# Patient Record
Sex: MALE | Race: WHITE | NOT HISPANIC OR LATINO | Employment: STUDENT | URBAN - METROPOLITAN AREA
[De-identification: names, ages, dates, MRNs, and addresses within clinical notes are randomized per-mention and may not be internally consistent; named-entity substitution may affect disease eponyms.]

---
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
## 2023-08-03 ENCOUNTER — OFFICE VISIT (OUTPATIENT)
Dept: FAMILY MEDICINE CLINIC | Facility: CLINIC | Age: 23
End: 2023-08-03
Payer: COMMERCIAL

## 2023-08-03 VITALS
RESPIRATION RATE: 14 BRPM | HEIGHT: 75 IN | SYSTOLIC BLOOD PRESSURE: 132 MMHG | TEMPERATURE: 97 F | WEIGHT: 212 LBS | DIASTOLIC BLOOD PRESSURE: 80 MMHG | BODY MASS INDEX: 26.36 KG/M2 | HEART RATE: 76 BPM | OXYGEN SATURATION: 98 %

## 2023-08-03 DIAGNOSIS — F32.1 MAJOR DEPRESSIVE DISORDER, SINGLE EPISODE, MODERATE: ICD-10-CM

## 2023-08-03 DIAGNOSIS — F90.9 ATTENTION DEFICIT HYPERACTIVITY DISORDER (ADHD), UNSPECIFIED ADHD TYPE: Primary | ICD-10-CM

## 2023-08-03 PROCEDURE — 99203 OFFICE O/P NEW LOW 30 MIN: CPT | Performed by: NURSE PRACTITIONER

## 2023-08-03 RX ORDER — BUPROPION HYDROCHLORIDE 150 MG/1
150 TABLET ORAL DAILY
Qty: 30 TABLET | Refills: 0 | Status: SHIPPED | OUTPATIENT
Start: 2023-08-03

## 2023-08-03 NOTE — PATIENT INSTRUCTIONS
Discharge instructions      Focus on a daily to do list,  Have a specific study plan daily you need to study most days of the week at least 6 days a week  To keep up and maintain start starting from day 1 for your next test, stay on top of things obsessively with your school and this will make your life easier.    Make sure you good good sleep, wean off from marijuana,  Avoid alcohol or responsibly as discussed on the weekend.      Consider a counselor,  Psychiatry if ADHD medication is required,    Bupropion 150 mg start this in the morning tomorrow  Daily for the first week then increase to 2 a day thereafter but call me let me know you are doing okay I will need to rewrite your prescription to 300 mg daily  Or I can do this when you come back  Recheck in the office in 2 weeks  Any severe symptoms severe agitation abnormal thinking suicidal ideation emergency room    Antidepressants, persons less than 25 need to be monitored more closely due to blackbox warnings but these are still considered safe and benefit far outweighs any risk for you in my opinion  Local behavioral [facility]  [facility]

## 2023-08-07 NOTE — PROGRESS NOTES
"Chief Complaint  Establish Care, Annual Exam, Depression, and Anxiety    Subjective        Matt Khanna presents to [facility]  History of Present Illness  Pleasant patient here today needs a new PCP as well as to discuss the potential for ADHD treatment was diagnosed  Right middle school I believe he had formalized testing and signs and symptoms which began sooner, at that time he had taken Strattera modest improvement,  He is more interested in bupropion now as well as treatment other possibilities,  Presently working full-time as well started college,  Just feels like he needs more focus, he has a difficult time getting focused and staying on task and he thinks it is related to his ADHD  Denies any increased depression or anxiety otherwise no increasing mood disorder symptoms, no agitation racing thoughts otherwise.      Social history no alcohol abuse but does smoke marijuana,  But willing to stop.  Depression  Anxiety      His past medical history is significant for depression.     Objective   Vital Signs:  /80   Pulse 76   Temp 97 øF (36.1 øC) (Temporal)   Resp 14   Ht 190.5 cm (75\")   Wt 96.2 kg (212 lb)   SpO2 98%   BMI 26.50 kg/mý   Estimated body mass index is 26.5 kg/mý as calculated from the following:    Height as of this encounter: 190.5 cm (75\").    Weight as of this encounter: 96.2 kg (212 lb).          Physical Exam   Result Review :                Assessment and Plan   Diagnoses and all orders for this visit:    1. Attention deficit hyperactivity disorder (ADHD), unspecified ADHD type (Primary)    2. Major depressive disorder, single episode, moderate    Other orders  -     buPROPion XL (Wellbutrin XL) 150 MG 24 hr tablet; Take 1 tablet by mouth Daily.  Dispense: 30 tablet; Refill: 0             Follow Up   Return in about 2 weeks (around 8/17/2023).  Patient was given instructions and counseling regarding his condition or for health maintenance advice. " Please see specific information pulled into the AVS if appropriate.       Bupropion may be a good choice however, the true stimulants typically are more effective in treating ADHD it depends, we can try this and he can go forward if needed to psychiatry  Discussed black box warning need for closer follow-up,  Risk-benefit other alternatives discussed,  Encourage lifestyle changes,    Encourage continue good decisions    Patient Instructions   Discharge instructions      Focus on a daily to do list,  Have a specific study plan daily you need to study most days of the week at least 6 days a week  To keep up and maintain start starting from day 1 for your next test, stay on top of things obsessively with your school and this will make your life easier.    Make sure you good good sleep, wean off from marijuana,  Avoid alcohol or responsibly as discussed on the weekend.      Consider a counselor,  Psychiatry if ADHD medication is required,    Bupropion 150 mg start this in the morning tomorrow  Daily for the first week then increase to 2 a day thereafter but call me let me know you are doing okay I will need to rewrite your prescription to 300 mg daily  Or I can do this when you come back  Recheck in the office in 2 weeks  Any severe symptoms severe agitation abnormal thinking suicidal ideation emergency room    Antidepressants, persons less than 25 need to be monitored more closely due to blackbox warnings but these are still considered safe and benefit far outweighs any risk for you in my opinion  Local behavioral [facility]  [facility]

## 2023-08-17 ENCOUNTER — OFFICE VISIT (OUTPATIENT)
Dept: FAMILY MEDICINE CLINIC | Facility: CLINIC | Age: 23
End: 2023-08-17
Payer: COMMERCIAL

## 2023-08-17 VITALS
BODY MASS INDEX: 25.8 KG/M2 | DIASTOLIC BLOOD PRESSURE: 64 MMHG | TEMPERATURE: 96 F | SYSTOLIC BLOOD PRESSURE: 120 MMHG | HEART RATE: 72 BPM | OXYGEN SATURATION: 96 % | WEIGHT: 207.5 LBS | HEIGHT: 75 IN

## 2023-08-17 DIAGNOSIS — F98.8 ATTENTION DEFICIT DISORDER, UNSPECIFIED HYPERACTIVITY PRESENCE: Primary | ICD-10-CM

## 2023-08-17 PROCEDURE — 99214 OFFICE O/P EST MOD 30 MIN: CPT | Performed by: NURSE PRACTITIONER

## 2023-08-17 RX ORDER — BUPROPION HYDROCHLORIDE 150 MG/1
150 TABLET ORAL DAILY
Qty: 30 TABLET | Refills: 1 | Status: SHIPPED | OUTPATIENT
Start: 2023-08-17

## 2023-08-17 NOTE — PATIENT INSTRUCTIONS
Discharge instructions continue present plan,  Cognitive behavior therapy, have a specific strategy to help you make changes such as procrastination daily to do list and make sure he Prosulf at least 1-3 items a day    Focus on developing study habits ask your friends what they do they are doing well, Google study habits,    Less effort more results, equals less stress, more success, master this at a young age,  Realize the importance and really start studying out of the gait that is how you will do well with his semester, you require at least 2 hours a day of study and likely in doing that she will likely make straight A's,    Continue these changes you live, initially you have a little more anxiety doing this but later the anxiety levels will go down greatly,    Increased anxiety agitation irritability abnormal thinking stop the medication and seek immediate treatment but otherwise follow-up here in 6 weeks if you could update me in 2 to 3 weeks that would be great thank you

## 2023-08-22 NOTE — PROGRESS NOTES
"Chief Complaint  ADHD (3 week f/u.  \"I quit liked it.\" Denies reactions to new meds.  Noticed one episode of eating food right after 3rd shift and vomited.)    Subjective        Matt Khanna presents to [facility]  History of Present Illness  Pleasant patient here today follow-up attention deficit possibly attention deficit disorder,  Chronic anxiety chronic depression major, mild to moderate, quite a bit depressed sedation, motivation difficulties,  Started bupropion, helped him quite a bit is able to focus and have more energy he had no negative side effects no racing thoughts or irritability agitation or any insomnia he wants to continue this,  He is made a lot of changes in his life,  Preparing to go back to school,  Quit smoking marijuana as well.      Objective   Vital Signs:  /64   Pulse 72   Temp 96 øF (35.6 øC) (Temporal)   Ht 190.5 cm (75\")   Wt 94.1 kg (207 lb 8 oz)   SpO2 96%   BMI 25.94 kg/mý   Estimated body mass index is 25.94 kg/mý as calculated from the following:    Height as of this encounter: 190.5 cm (75\").    Weight as of this encounter: 94.1 kg (207 lb 8 oz).          Physical Exam  Constitutional:       Appearance: Normal appearance. He is not ill-appearing or diaphoretic.   Eyes:      Conjunctiva/sclera: Conjunctivae normal.      Pupils: Pupils are equal, round, and reactive to light.   Skin:     General: Skin is warm and dry.   Neurological:      Mental Status: He is alert.   Psychiatric:         Mood and Affect: Mood normal.         Behavior: Behavior normal.         Thought Content: Thought content normal.         Judgment: Judgment normal.      Comments: Pleasant appropriate good eye contact,  Mildly anxious but smiling at times, appropriately speech clear concise without pressured speech able to stay on topic and finished without tangential thinking dress appropriate      Result Review :                Assessment and Plan   Diagnoses and all " orders for this visit:    1. Attention deficit disorder, unspecified hyperactivity presence (Primary)    Other orders  -     buPROPion XL (Wellbutrin XL) 150 MG 24 hr tablet; Take 1 tablet by mouth Daily.  Dispense: 30 tablet; Refill: 1           I spent 30  minutes caring for Matt on this date of service. This time includes time spent by me in the following activities:preparing for the visit, reviewing tests, obtaining and/or reviewing a separately obtained history, performing a medically appropriate examination and/or evaluation , counseling and educating the patient/family/caregiver, referring and communicating with other health care professionals , documenting information in the medical record, and care coordination  Follow Up   No follow-ups on file.  Patient was given instructions and counseling regarding his condition or for health maintenance advice. Please see specific information pulled into the AVS if appropriate.     Patient Instructions   Discharge instructions continue present plan,  Cognitive behavior therapy, have a specific strategy to help you make changes such as procrastination daily to do list and make sure he Prosulf at least 1-3 items a day    Focus on developing study habits ask your friends what they do they are doing well, Google study habits,    Less effort more results, equals less stress, more success, master this at a young age,  Realize the importance and really start studying out of the gait that is how you will do well with his semester, you require at least 2 hours a day of study and likely in doing that she will likely make straight A's,    Continue these changes you live, initially you have a little more anxiety doing this but later the anxiety levels will go down greatly,    Increased anxiety agitation irritability abnormal thinking stop the medication and seek immediate treatment but otherwise follow-up here in 6 weeks if you could update me in 2 to 3 weeks that would be  great thank you

## 2023-10-20 ENCOUNTER — OFFICE VISIT (OUTPATIENT)
Dept: FAMILY MEDICINE CLINIC | Facility: CLINIC | Age: 23
End: 2023-10-20
Payer: COMMERCIAL

## 2023-10-20 VITALS
WEIGHT: 204 LBS | DIASTOLIC BLOOD PRESSURE: 80 MMHG | OXYGEN SATURATION: 97 % | SYSTOLIC BLOOD PRESSURE: 140 MMHG | HEART RATE: 80 BPM | TEMPERATURE: 98.9 F | BODY MASS INDEX: 25.36 KG/M2 | HEIGHT: 75 IN

## 2023-10-20 DIAGNOSIS — F41.9 ANXIETY: Primary | ICD-10-CM

## 2023-10-20 PROCEDURE — 99214 OFFICE O/P EST MOD 30 MIN: CPT | Performed by: NURSE PRACTITIONER

## 2023-10-20 RX ORDER — ESCITALOPRAM OXALATE 5 MG/1
5 TABLET ORAL DAILY
Qty: 30 TABLET | Refills: 1 | Status: SHIPPED | OUTPATIENT
Start: 2023-10-20

## 2023-10-20 RX ORDER — BUPROPION HYDROCHLORIDE 75 MG/1
75 TABLET ORAL DAILY
Qty: 30 TABLET | Refills: 2 | Status: SHIPPED | OUTPATIENT
Start: 2023-10-20

## 2023-10-20 NOTE — PATIENT INSTRUCTIONS
Discharge instructions continue  Present plan you are doing very well in the big picture, you are very motivated you will do well    [facility] call ASAP for appointment    Otherwise  Bright lights positive aromas  Positive music, good study location focus on study habits 1 to 2 hours each subject daily with these upper-level difficult classes at least 5 to 6 days a week    Decrease bupropion down to 75 mg daily  In the morning start Lexapro 5 mg 1/2 tablet daily for 2 weeks if drowsiness or increased anxiety then every other day for 2 weeks  Will increase it to 1 tablet as soon as possible or anytime that recheck in the office in 2 weeks

## 2023-10-25 NOTE — PROGRESS NOTES
"Chief Complaint  Follow-up (6w follow up)    Subjective        Matt Khanna presents to [facility]  History of Present Illness  Pleasant gentleman, making lots of changes in his life and now going to nursing school, under quite a bit of pressure.  Quit smoking marijuana couple months ago, he has had increased anxiety, is no suicidal ideation no agitation no severe depression,  Bupropion has been helping, but now he is really in the middle school and thinks this is what is going on just feels overwhelmed with the studies he studying every day getting into groups as well but does not feel like he is making a lot of progress, has increased anxiety and did not do well on her recent test he did speak to his instructor he possibly can pull it out,    No immediate,  No drug abuse or alcohol abuse he is making good decisions presently      Objective   Vital Signs:  /80   Pulse 80   Temp 98.9 °F (37.2 °C)   Ht 190.5 cm (75\")   Wt 92.5 kg (204 lb)   SpO2 97%   BMI 25.50 kg/m²   Estimated body mass index is 25.5 kg/m² as calculated from the following:    Height as of this encounter: 190.5 cm (75\").    Weight as of this encounter: 92.5 kg (204 lb).          Physical Exam  Constitutional:       General: He is not in acute distress.     Appearance: Normal appearance. He is not ill-appearing, toxic-appearing or diaphoretic.   Eyes:      Conjunctiva/sclera: Conjunctivae normal.      Pupils: Pupils are equal, round, and reactive to light.   Pulmonary:      Effort: Pulmonary effort is normal. No respiratory distress.      Breath sounds: No stridor.   Skin:     General: Skin is warm and dry.   Neurological:      General: No focal deficit present.      Mental Status: He is alert. Mental status is at baseline.   Psychiatric:         Mood and Affect: Mood normal.         Behavior: Behavior normal.         Thought Content: Thought content normal.         Judgment: Judgment normal.      Comments: " Anxious, fidgets frequently in bed good eye contact quite pleasant answers appropriately  No pressured speech no dominique symptoms,  Able to sit comfortably in his chair, without pacing, dress appropriate as well and is cognitively intact and rational.        Result Review :                Assessment and Plan   Diagnoses and all orders for this visit:    1. Anxiety (Primary)    Other orders  -     buPROPion (WELLBUTRIN) 75 MG tablet; Take 1 tablet by mouth Daily.  Dispense: 30 tablet; Refill: 2  -     escitalopram (Lexapro) 5 MG tablet; Take 1 tablet by mouth Daily. (Start 1/2 tab x 2 weeks)  Dispense: 30 tablet; Refill: 1             Follow Up   Return in about 2 weeks (around 11/3/2023).  Patient was given instructions and counseling regarding his condition or for health maintenance advice. Please see specific information pulled into the AVS if appropriate.     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  I think the study situations is not ideal for how he feels everyone else is anxiety and is not making progress with the study sessions  As he may have ADD see psychiatry as soon as possible, I will decrease the bupropion a little  And add a low-dose Lexapro cautiously not to interfere with his studies  We will increase gradually a lot of reassurance today and hopefully encouraged him better study habits and sometimes it may be best to avoid group situations because of increased anxiety and difficulty with focusing reach out to his family friends immediately any severe or worse depression or other difficulties suicidal ideation emergency room.  Will have close follow-up is good communication family friends have discussed black box warnings  Thoroughly with the plan with patient  Office visit 30-minute  greater than 50% counseling        Patient Instructions   Discharge instructions continue  Present plan you are doing very well in the big picture, you are very motivated you will do well    [facility] [place] call ASAP for appointment    Otherwise  Bright lights positive aromas  Positive music, good study location focus on study habits 1 to 2 hours each subject daily with these upper-level difficult classes at least 5 to 6 days a week    Decrease bupropion down to 75 mg daily  In the morning start Lexapro 5 mg 1/2 tablet daily for 2 weeks if drowsiness or increased anxiety then every other day for 2 weeks  Will increase it to 1 tablet as soon as possible or anytime that recheck in the office in 2 weeks

## 2023-11-03 ENCOUNTER — OFFICE VISIT (OUTPATIENT)
Dept: FAMILY MEDICINE CLINIC | Facility: CLINIC | Age: 23
End: 2023-11-03
Payer: COMMERCIAL

## 2023-11-03 VITALS
OXYGEN SATURATION: 97 % | WEIGHT: 208 LBS | SYSTOLIC BLOOD PRESSURE: 128 MMHG | TEMPERATURE: 98 F | RESPIRATION RATE: 14 BRPM | BODY MASS INDEX: 25.86 KG/M2 | HEART RATE: 86 BPM | HEIGHT: 75 IN | DIASTOLIC BLOOD PRESSURE: 80 MMHG

## 2023-11-03 DIAGNOSIS — F90.9 ATTENTION DEFICIT HYPERACTIVITY DISORDER (ADHD), UNSPECIFIED ADHD TYPE: Primary | ICD-10-CM

## 2023-11-03 PROCEDURE — 99214 OFFICE O/P EST MOD 30 MIN: CPT | Performed by: NURSE PRACTITIONER

## 2023-11-03 RX ORDER — ATOMOXETINE 18 MG/1
18 CAPSULE ORAL DAILY
Qty: 30 CAPSULE | Refills: 0 | Status: SHIPPED | OUTPATIENT
Start: 2023-11-03

## 2023-11-03 RX ORDER — ESCITALOPRAM OXALATE 10 MG/1
10 TABLET ORAL DAILY
Qty: 30 TABLET | Refills: 1 | Status: SHIPPED | OUTPATIENT
Start: 2023-11-03

## 2023-11-03 NOTE — PATIENT INSTRUCTIONS
Discharge instructions    Discontinue bupropion    Increase Lexapro  Starting today  5 mg daily  For 3 days then 10 mg daily thereafter    Start Strattera lower dose, 18 mg daily  After 3 days increase to 2 a day  Recheck with me in 1 week    Any difficulties any severe anxiety abnormal thinking suicidal ideation emergency room  Otherwise continue present plan you doing very well discontinue sticking with this, getting through these hurdles, will make you stronger than long.    Avoid alcohol this weekend, you cannot afford to have 2 to 3 days of your brain recovering from alcohol,  I would encourage you no more than 4 beers 2 days a week responsibly    Call for counseling and psychiatry appointment  [facility]  [facility]    [facility] neuropsychiatric assessment is very excellent if you need more insight and your mental health this would be quite informative as well, Juanpablo Garcia call for appointment  You can do both

## 2023-11-06 ENCOUNTER — TELEPHONE (OUTPATIENT)
Dept: FAMILY MEDICINE CLINIC | Facility: CLINIC | Age: 23
End: 2023-11-06
Payer: COMMERCIAL

## 2023-11-06 DIAGNOSIS — Z11.59 NEED FOR HEPATITIS C SCREENING TEST: Primary | ICD-10-CM

## 2023-11-06 DIAGNOSIS — Z79.899 MEDICATION MANAGEMENT: ICD-10-CM

## 2023-11-06 NOTE — TELEPHONE ENCOUNTER
Pt scheduled for LABS on Wednesday 11/08/23 @ 10:50 am before Follow up/Physical Appt on Friday 11/10/23 @ 3:15pm.  Orders for LABS needed please.

## 2023-11-09 NOTE — PROGRESS NOTES
"Chief Complaint  Anxiety (Pt mother states pt is still having bad anxiety. Wants to know if there is something norman can give to help with anxiety as well as ADHD )    Subjective        Matt Khanna presents to [facility]  History of Present Illness  Pleasant gentleman here today follow-up attention deficit difficulties anxiety, especially after making great changes in his life,  He has increased anxiety feelings of panic, difficulty concentrating,  His mother is here today with support also to help explain his past medical history as well.  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  We had started very low-dose of Lexapro and  Decreased his bupropion a follow-up but he still has increased anxiety, he is taking a temporary  Break from work,  He recently quit smoking marijuana, and is making great changes in his life    Diagnosed with attention deficit disorder early age, around 7 greater so opted to treat with supportive treatment, but for some time he took Strattera he did quite well he is interested in starting this today  Anxiety            Objective   Vital Signs:  /80   Pulse 86   Temp 98 °F (36.7 °C) (Temporal)   Resp 14   Ht 190.5 cm (75\")   Wt 94.3 kg (208 lb)   SpO2 97%   BMI 26.00 kg/m²   Estimated body mass index is 26 kg/m² as calculated from the following:    Height as of this encounter: 190.5 cm (75\").    Weight as of this encounter: 94.3 kg (208 lb).          Physical Exam  Constitutional:       General: He is not in acute distress.     Appearance: Normal appearance. He is not ill-appearing, toxic-appearing or diaphoretic.   HENT:      Right Ear: Tympanic membrane normal.      Left Ear: Tympanic membrane normal.   Eyes:      " Conjunctiva/sclera: Conjunctivae normal.      Pupils: Pupils are equal, round, and reactive to light.   Skin:     General: Skin is warm and dry.      Capillary Refill: Capillary refill takes less than 2 seconds.   Neurological:      General: No focal deficit present.      Mental Status: He is alert. Mental status is at baseline.   Psychiatric:         Mood and Affect: Mood normal.         Behavior: Behavior normal.         Thought Content: Thought content normal.         Judgment: Judgment normal.      Comments: Anxious a little fidgety but good eye contact, speech is clear, appropriate no dominique, he can stay on target and answer questions appropriately without tangential thinking, able to close his thought, stay organized,  And appropriate interview, he is quite pleasant, dress appropriate for situation.        Result Review :                Assessment and Plan   Diagnoses and all orders for this visit:    1. Attention deficit hyperactivity disorder (ADHD), unspecified ADHD type (Primary)    Other orders  -     atomoxetine (Strattera) 18 MG capsule; Take 1 capsule by mouth Daily. For ADD  Dispense: 30 capsule; Refill: 0  -     escitalopram (Lexapro) 10 MG tablet; Take 1 tablet by mouth Daily.  Dispense: 30 tablet; Refill: 1           I spent 30  minutes caring for Matt on this date of service. This time includes time spent by me in the following activities:preparing for the visit, reviewing tests, obtaining and/or reviewing a separately obtained history, performing a medically appropriate examination and/or evaluation , counseling and educating the patient/family/caregiver, ordering medications, tests, or procedures, documenting information in the medical record, and care coordination  Follow Up   Return in about 1 week (around 11/10/2023) for Annual physical, Labs before next visit.  Patient was given instructions and counseling regarding his condition or for health maintenance advice. Please see specific  information pulled into the AVS if appropriate.     Patient Instructions   Discharge instructions    Discontinue bupropion    Increase Lexapro  Starting today  5 mg daily  For 3 days then 10 mg daily thereafter    Start Strattera lower dose, 18 mg daily  After 3 days increase to 2 a day  Recheck with me in 1 week    Any difficulties any severe anxiety abnormal thinking suicidal ideation emergency room  Otherwise continue present plan you doing very well discontinue sticking with this, getting through these hurdles, will make you stronger than long.    Avoid alcohol this weekend, you cannot afford to have 2 to 3 days of your brain recovering from alcohol,  I would encourage you no more than 4 beers 2 days a week responsibly    Call for counseling and psychiatry appointment  [facility]  [facility] [place] [facility]    [facility] neuropsychiatric assessment is very excellent if you need more insight and your mental health this would be quite informative as well, Juanpablo Garcia call for appointment  You can do both

## 2023-11-15 ENCOUNTER — OFFICE VISIT (OUTPATIENT)
Dept: FAMILY MEDICINE CLINIC | Facility: CLINIC | Age: 23
End: 2023-11-15
Payer: COMMERCIAL

## 2023-11-15 VITALS
OXYGEN SATURATION: 97 % | HEART RATE: 87 BPM | SYSTOLIC BLOOD PRESSURE: 140 MMHG | RESPIRATION RATE: 14 BRPM | DIASTOLIC BLOOD PRESSURE: 68 MMHG | BODY MASS INDEX: 27.35 KG/M2 | HEIGHT: 75 IN | WEIGHT: 220 LBS | TEMPERATURE: 97.7 F

## 2023-11-15 DIAGNOSIS — Z11.3 SCREEN FOR STD (SEXUALLY TRANSMITTED DISEASE): ICD-10-CM

## 2023-11-15 DIAGNOSIS — Z11.59 NEED FOR HEPATITIS B SCREENING TEST: ICD-10-CM

## 2023-11-15 DIAGNOSIS — D22.9 NUMEROUS SKIN MOLES: ICD-10-CM

## 2023-11-15 DIAGNOSIS — Z00.00 HEALTH MAINTENANCE EXAMINATION: Primary | ICD-10-CM

## 2023-11-15 DIAGNOSIS — F41.9 ANXIETY: ICD-10-CM

## 2023-11-15 PROCEDURE — 99395 PREV VISIT EST AGE 18-39: CPT | Performed by: NURSE PRACTITIONER

## 2023-11-15 RX ORDER — ATOMOXETINE 80 MG/1
80 CAPSULE ORAL DAILY
Qty: 30 CAPSULE | Refills: 2 | Status: SHIPPED | OUTPATIENT
Start: 2023-11-15

## 2023-11-15 RX ORDER — ATOMOXETINE 40 MG/1
40 CAPSULE ORAL DAILY
Qty: 30 CAPSULE | Refills: 0 | Status: SHIPPED | OUTPATIENT
Start: 2023-11-15

## 2023-11-15 NOTE — PROGRESS NOTES
"Chief Complaint  ADHD and Annual Exam    Subjective        Matt Khanna presents to [facility]  History of Present Illness  Pleasant gentleman here today for health maintenance as well as follow-up situational anxiety ADHD,  Patient's been making great changes in his life, he is very motivated and focused on college, but during the transition, he was having increased anxiety, feelings of panic, difficulty concentrating, he spent lots of effort in his studies, and not doing as well as he should and specifically several areas of difficulties in his classes.  Originally we tried generic Wellbutrin initially was helpful but actually was probably causing increased anxiety, as well as the stresses of school.  He was here accompanied by his mother recently, and we discussed increasing Lexapro, as well as Strattera he did well with this in the past  Since he has been getting good communications with instructors and specifically wants to take an additional time for him, and he is getting testing and he may have some reading comprehension issues,  At some point he may consider stimulant as well and he has follow-up testing next week,  He wants to continue Lexapro very pleased with the improvement with his anxiety levels, and he would like to go ahead move Strattera up as soon as possible, at this point has not noticed a difference with Strattera but he did great and middle school.  Previously had taken a total of 80 mg daily.    Presently no drug or alcohol abuse recently quit smoking marijuana      Objective   Vital Signs:  /68   Pulse 87   Temp 97.7 °F (36.5 °C) (Infrared)   Resp 14   Ht 190.5 cm (75\")   Wt 99.8 kg (220 lb)   SpO2 97%   BMI 27.50 kg/m²   Estimated body mass index is 27.5 kg/m² as calculated from the following:    Height as of this encounter: 190.5 cm (75\").    Weight as of this encounter: 99.8 kg (220 lb).          Physical Exam  Vitals reviewed. "   Constitutional:       General: He is not in acute distress.     Appearance: Normal appearance. He is well-developed. He is not ill-appearing, toxic-appearing or diaphoretic.   HENT:      Head: Normocephalic.      Nose: Nose normal.   Eyes:      General: No scleral icterus.     Conjunctiva/sclera: Conjunctivae normal.      Pupils: Pupils are equal, round, and reactive to light.   Neck:      Thyroid: No thyromegaly.      Vascular: No JVD.   Cardiovascular:      Rate and Rhythm: Normal rate and regular rhythm.      Heart sounds: Normal heart sounds. No murmur heard.     No friction rub. No gallop.   Pulmonary:      Effort: Pulmonary effort is normal. No respiratory distress.      Breath sounds: Normal breath sounds. No stridor. No wheezing or rales.   Abdominal:      General: Abdomen is flat. Bowel sounds are normal. There is no distension.      Palpations: Abdomen is soft.      Tenderness: There is no abdominal tenderness.      Comments: No hepatosplenomegaly, no ascites,   Musculoskeletal:         General: No tenderness.      Cervical back: Neck supple.   Lymphadenopathy:      Cervical: No cervical adenopathy.   Skin:     General: Skin is warm and dry.      Findings: No erythema or rash.      Comments: Quite a few macules and moles, nothing worrisome, chest and back.   Neurological:      General: No focal deficit present.      Mental Status: He is alert and oriented to person, place, and time. Mental status is at baseline.      Deep Tendon Reflexes: Reflexes are normal and symmetric.   Psychiatric:         Mood and Affect: Mood normal.         Behavior: Behavior normal.         Thought Content: Thought content normal.         Judgment: Judgment normal.      Comments: Good eye contact appropriate pleasant appropriate dress cognitively intact able to express himself without tangential thinking,.        Result Review :                Assessment and Plan   Diagnoses and all orders for this visit:    1. Health  maintenance examination (Primary)  -     Lipid Panel With LDL / HDL Ratio; Future  -     TSH Rfx On Abnormal To Free T4; Future  -     Urinalysis With Microscopic If Indicated (No Culture) - Urine, Clean Catch; Future  -     Chlamydia trachomatis, Neisseria gonorrhoeae, Trichomonas vaginalis, PCR - Urine, Urine, Clean Catch; Future  -     Hepatitis B Surface Antibody; Future    2. Numerous skin moles  -     Ambulatory Referral to Dermatology    3. Anxiety  -     Lipid Panel With LDL / HDL Ratio; Future  -     TSH Rfx On Abnormal To Free T4; Future  -     Urinalysis With Microscopic If Indicated (No Culture) - Urine, Clean Catch; Future  -     Chlamydia trachomatis, Neisseria gonorrhoeae, Trichomonas vaginalis, PCR - Urine, Urine, Clean Catch; Future    4. Screen for STD (sexually transmitted disease)  -     Lipid Panel With LDL / HDL Ratio; Future  -     TSH Rfx On Abnormal To Free T4; Future  -     Urinalysis With Microscopic If Indicated (No Culture) - Urine, Clean Catch; Future  -     Chlamydia trachomatis, Neisseria gonorrhoeae, Trichomonas vaginalis, PCR - Urine, Urine, Clean Catch; Future    5. Need for hepatitis B screening test  -     Hepatitis B Surface Antibody; Future    Other orders  -     atomoxetine (Strattera) 40 MG capsule; Take 1 capsule by mouth Daily.  Dispense: 30 capsule; Refill: 0  -     atomoxetine (Strattera) 80 MG capsule; Take 1 capsule by mouth Daily. For ADHD  Dispense: 30 capsule; Refill: 2             Follow Up   No follow-ups on file.  Patient was given instructions and counseling regarding his condition or for health maintenance advice. Please see specific information pulled into the AVS if appropriate.     Patient Instructions   Discharge instructions    Increase Strattera  40 mg daily for 3 to 7 days  As long as you are doing well increase it to 80 mg daily thereafter but if increased anxiety or any issues immediately hold.  And lower the dose  Continue Lexapro 10 mg daily no  change  Recheck in 2 weeks,    Follow-up with testing  At some point scheduled appointment with psychiatry likely,  Continue counseling, counseling would be excellent ,    As long as you are doing well,  2 weeks.  Fasting lab  When you return or outpatient lab at the hospital at your convenience fasting

## 2023-11-15 NOTE — PATIENT INSTRUCTIONS
Discharge instructions    Increase Strattera  40 mg daily for 3 to 7 days  As long as you are doing well increase it to 80 mg daily thereafter but if increased anxiety or any issues immediately hold.  And lower the dose  Continue Lexapro 10 mg daily no change  Recheck in 2 weeks,    Follow-up with testing  At some point scheduled appointment with psychiatry likely,  Continue counseling, counseling would be excellent ,    As long as you are doing well,  2 weeks.  Fasting lab  When you return or outpatient lab at the hospital at your convenience fasting

## 2023-12-19 ENCOUNTER — CLINICAL SUPPORT (OUTPATIENT)
Dept: FAMILY MEDICINE CLINIC | Facility: CLINIC | Age: 23
End: 2023-12-19
Payer: COMMERCIAL

## 2023-12-19 DIAGNOSIS — Z23 NEED FOR HEPATITIS B VACCINATION: Primary | ICD-10-CM

## 2023-12-19 PROCEDURE — 90471 IMMUNIZATION ADMIN: CPT | Performed by: NURSE PRACTITIONER

## 2023-12-19 PROCEDURE — 95115 IMMUNOTHERAPY ONE INJECTION: CPT | Performed by: NURSE PRACTITIONER

## 2023-12-19 PROCEDURE — 90744 HEPB VACC 3 DOSE PED/ADOL IM: CPT | Performed by: NURSE PRACTITIONER

## 2024-01-25 ENCOUNTER — OFFICE VISIT (OUTPATIENT)
Dept: FAMILY MEDICINE CLINIC | Facility: CLINIC | Age: 24
End: 2024-01-25
Payer: COMMERCIAL

## 2024-01-25 VITALS
OXYGEN SATURATION: 99 % | HEART RATE: 88 BPM | WEIGHT: 220 LBS | DIASTOLIC BLOOD PRESSURE: 76 MMHG | BODY MASS INDEX: 27.35 KG/M2 | SYSTOLIC BLOOD PRESSURE: 144 MMHG | HEIGHT: 75 IN | TEMPERATURE: 98 F

## 2024-01-25 DIAGNOSIS — F41.9 ANXIETY: ICD-10-CM

## 2024-01-25 DIAGNOSIS — F98.8 ATTENTION DEFICIT DISORDER, UNSPECIFIED HYPERACTIVITY PRESENCE: Primary | ICD-10-CM

## 2024-01-25 PROCEDURE — 99213 OFFICE O/P EST LOW 20 MIN: CPT | Performed by: NURSE PRACTITIONER

## 2024-01-25 NOTE — PATIENT INSTRUCTIONS
Discharge instructions    Psychiatry call for an appointment [facility]  [facility] or psychiatry of choice for ADD treatment    However your success in school should not depend on the  You have all the resources now to succeed in school in my opinion    You will make up the difference of any reading comprehension difficulties by  Routine,  Finding out what is on the test study only what you need to know  Study which you do not know well  Avoid repetitive studying of what you do now  Make sure you cover sufficient ground this is likely the most common mistake spending too much time in 1 topic  Usually we do not need to read everything  Generally read the highlights  Frequent discussions of what is on the test  Study  Several times throughout the day,  The brain remembers by short periods of study and overlap by  Separate activities in other words we learn more about the study and something a few minutes every day as opposed to trying to cram something and then 3 hours    Make sure you cover the ground daily  Go back and restudy things quickly  Very good study habits found the location which is best for you albeit a library,  Bright lights  Consider recording several of your classes but avoid the pitfalls of spending all your time listening,  And squatting during your good study times, avoid probably study groups may not be helpful for you and likely increase your anxiety      Develop confidence with repetition,  Likely over the next several months will continue to improve your study habits and this is how you will succeed  More study habits more maneuvering will be more beneficial likely the medication,    Follow-up the office in 6 weeks sooner for any difficulties, you should be studying for your test presently    Study for your quizzes and your test at the same time you can separate the task  So we can focus on individually but do not pull it off next week's test

## 2024-02-05 NOTE — PROGRESS NOTES
"Chief Complaint  ADD (Pt has add or adhd and needs something to help )    Subjective        Matt Khanna presents to [facility]  History of Present Illness  Follow-up, potential ADD, recent testing suggested he had ADD  Strattera not sure if helping Lexapro helps with anxiety has made great changes in his life, avoiding marijuana really focused on studying but still have difficult time with his classes,  He is in high-level classes and nursing  Would like to know the neck step,  No drug alcohol abuse presently  Lots of communication with his teachers      ADD    Objective   Vital Signs:  /76   Pulse 88   Temp 98 °F (36.7 °C) (Infrared)   Ht 190.5 cm (75\")   Wt 99.8 kg (220 lb)   SpO2 99%   BMI 27.50 kg/m²   Estimated body mass index is 27.5 kg/m² as calculated from the following:    Height as of this encounter: 190.5 cm (75\").    Weight as of this encounter: 99.8 kg (220 lb).          Physical Exam  Vitals reviewed.   Constitutional:       Appearance: Normal appearance. He is well-developed.   HENT:      Head: Normocephalic.      Nose: Nose normal.   Eyes:      General: No scleral icterus.     Conjunctiva/sclera: Conjunctivae normal.      Pupils: Pupils are equal, round, and reactive to light.   Neck:      Thyroid: No thyromegaly.      Vascular: No JVD.   Cardiovascular:      Rate and Rhythm: Normal rate and regular rhythm.      Heart sounds: Normal heart sounds. No murmur heard.     No friction rub. No gallop.   Pulmonary:      Effort: Pulmonary effort is normal. No respiratory distress.      Breath sounds: No wheezing.   Abdominal:      General: Bowel sounds are normal.      Palpations: Abdomen is soft.      Comments: No hepatosplenomegaly, no ascites,   Musculoskeletal:         General: No tenderness.      Cervical back: Neck supple.   Lymphadenopathy:      Cervical: No cervical adenopathy.   Skin:     General: Skin is warm and dry.      Findings: No erythema or rash. "   Neurological:      Mental Status: He is alert and oriented to person, place, and time.      Deep Tendon Reflexes: Reflexes are normal and symmetric.   Psychiatric:         Mood and Affect: Mood normal.         Behavior: Behavior normal.         Thought Content: Thought content normal.         Judgment: Judgment normal.      Comments: Anxious but appropriate dress appropriate no dominique able to complete thought      Result Review :                Assessment and Plan   Diagnoses and all orders for this visit:    1. Attention deficit disorder, unspecified hyperactivity presence (Primary)    2. Anxiety           I spent 25 minutes caring for Matt on this date of service. This time includes time spent by me in the following activities:preparing for the visit, obtaining and/or reviewing a separately obtained history, performing a medically appropriate examination and/or evaluation , counseling and educating the patient/family/caregiver, ordering medications, tests, or procedures, documenting information in the medical record, and care coordination  Follow Up   Return 6 weeks.  Patient was given instructions and counseling regarding his condition or for health maintenance advice. Please see specific information pulled into the AVS if appropriate.     Patient Instructions   Discharge instructions    Psychiatry call for an appointment AS[facility] [place] [facility]  [facility] or psychiatry of choice for ADD treatment    However your success in school should not depend on the  You have all the resources now to succeed in school in my opinion    You will make up the difference of any reading comprehension difficulties by  Routine,  Finding out what is on the test study only what you need to know  Study which you do not know well  Avoid repetitive studying of what you do now  Make sure you cover sufficient ground this is likely the most common mistake spending too much time in 1 topic  Usually we do  not need to read everything  Generally read the highlights  Frequent discussions of what is on the test  Study  Several times throughout the day,  The brain remembers by short periods of study and overlap by  Separate activities in other words we learn more about the study and something a few minutes every day as opposed to trying to cram something and then 3 hours    Make sure you cover the ground daily  Go back and restudy things quickly  Very good study habits found the location which is best for you albeit a library,  Bright lights  Consider recording several of your classes but avoid the pitfalls of spending all your time listening,  And squatting during your good study times, avoid probably study groups may not be helpful for you and likely increase your anxiety      Develop confidence with repetition,  Likely over the next several months will continue to improve your study habits and this is how you will succeed  More study habits more maneuvering will be more beneficial likely the medication,    Follow-up the office in 6 weeks sooner for any difficulties, you should be studying for your test presently    Study for your quizzes and your test at the same time you can separate the task  So we can focus on individually but do not pull it off next week's test

## 2024-03-08 ENCOUNTER — TELEPHONE (OUTPATIENT)
Dept: FAMILY MEDICINE CLINIC | Facility: CLINIC | Age: 24
End: 2024-03-08

## 2024-03-08 NOTE — TELEPHONE ENCOUNTER
Please call patient, see how he is feeling?    He is getting daily counseling?  Or seeing psychiatry?    How is he doing in school  How is he doing overall emotionally,  Is he taking medication now?  1 to make sure he is doing okay let me know if he needs anything  Thank you

## 2024-03-22 ENCOUNTER — CLINICAL SUPPORT (OUTPATIENT)
Dept: FAMILY MEDICINE CLINIC | Facility: CLINIC | Age: 24
End: 2024-03-22
Payer: COMMERCIAL

## 2024-03-22 DIAGNOSIS — Z23 IMMUNIZATION DUE: Primary | ICD-10-CM

## 2024-03-22 PROCEDURE — 90471 IMMUNIZATION ADMIN: CPT | Performed by: NURSE PRACTITIONER

## 2024-03-22 PROCEDURE — 90746 HEPB VACCINE 3 DOSE ADULT IM: CPT | Performed by: NURSE PRACTITIONER

## 2024-03-22 PROCEDURE — 95115 IMMUNOTHERAPY ONE INJECTION: CPT | Performed by: NURSE PRACTITIONER
